# Patient Record
Sex: FEMALE | Race: WHITE | NOT HISPANIC OR LATINO
[De-identification: names, ages, dates, MRNs, and addresses within clinical notes are randomized per-mention and may not be internally consistent; named-entity substitution may affect disease eponyms.]

---

## 2017-02-06 ENCOUNTER — TRANSCRIPTION ENCOUNTER (OUTPATIENT)
Age: 52
End: 2017-02-06

## 2017-11-14 ENCOUNTER — APPOINTMENT (OUTPATIENT)
Dept: CT IMAGING | Facility: CLINIC | Age: 52
End: 2017-11-14
Payer: COMMERCIAL

## 2017-11-14 ENCOUNTER — OUTPATIENT (OUTPATIENT)
Dept: OUTPATIENT SERVICES | Facility: HOSPITAL | Age: 52
LOS: 1 days | End: 2017-11-14
Payer: COMMERCIAL

## 2017-11-14 DIAGNOSIS — Z00.8 ENCOUNTER FOR OTHER GENERAL EXAMINATION: ICD-10-CM

## 2017-11-14 PROCEDURE — 74174 CTA ABD&PLVS W/CONTRAST: CPT

## 2017-11-14 PROCEDURE — 74174 CTA ABD&PLVS W/CONTRAST: CPT | Mod: 26

## 2017-11-28 ENCOUNTER — APPOINTMENT (OUTPATIENT)
Dept: OBGYN | Facility: CLINIC | Age: 52
End: 2017-11-28
Payer: COMMERCIAL

## 2017-11-28 VITALS
SYSTOLIC BLOOD PRESSURE: 108 MMHG | OXYGEN SATURATION: 98 % | BODY MASS INDEX: 26.46 KG/M2 | DIASTOLIC BLOOD PRESSURE: 76 MMHG | RESPIRATION RATE: 18 BRPM | WEIGHT: 155 LBS | HEIGHT: 64 IN | HEART RATE: 88 BPM

## 2017-11-28 DIAGNOSIS — Z11.3 ENCOUNTER FOR SCREENING FOR INFECTIONS WITH A PREDOMINANTLY SEXUAL MODE OF TRANSMISSION: ICD-10-CM

## 2017-11-28 DIAGNOSIS — N95.2 POSTMENOPAUSAL ATROPHIC VAGINITIS: ICD-10-CM

## 2017-11-28 PROCEDURE — 99213 OFFICE O/P EST LOW 20 MIN: CPT

## 2017-12-04 LAB
HBV SURFACE AG SER QL: NONREACTIVE
HIV1+2 AB SPEC QL IA.RAPID: NONREACTIVE
HSV 1+2 IGG SER IA-IMP: NEGATIVE
HSV 1+2 IGG SER IA-IMP: POSITIVE
HSV1 IGG SER QL: 41.6 INDEX
HSV2 IGG SER QL: 0.09 INDEX
T PALLIDUM AB SER QL IA: NEGATIVE

## 2018-08-01 ENCOUNTER — APPOINTMENT (OUTPATIENT)
Dept: OBGYN | Facility: CLINIC | Age: 53
End: 2018-08-01
Payer: COMMERCIAL

## 2018-08-01 VITALS
HEART RATE: 88 BPM | OXYGEN SATURATION: 98 % | RESPIRATION RATE: 18 BRPM | BODY MASS INDEX: 25.61 KG/M2 | HEIGHT: 64 IN | SYSTOLIC BLOOD PRESSURE: 124 MMHG | WEIGHT: 150 LBS | DIASTOLIC BLOOD PRESSURE: 68 MMHG

## 2018-08-01 DIAGNOSIS — B00.9 HERPESVIRAL INFECTION, UNSPECIFIED: ICD-10-CM

## 2018-08-01 DIAGNOSIS — N90.9 NONINFLAMMATORY DISORDER OF VULVA AND PERINEUM, UNSPECIFIED: ICD-10-CM

## 2018-08-01 LAB
HSV 1+2 IGG SER IA-IMP: NEGATIVE
HSV 1+2 IGG SER IA-IMP: POSITIVE
HSV1 IGG SER QL: 33.7 INDEX
HSV2 IGG SER QL: 0.07 INDEX

## 2018-08-01 PROCEDURE — 99214 OFFICE O/P EST MOD 30 MIN: CPT

## 2018-08-03 LAB
HSV1 IGM SER QL: NORMAL TITER
HSV2 AB FLD-ACNC: NORMAL TITER

## 2018-08-06 ENCOUNTER — OTHER (OUTPATIENT)
Age: 53
End: 2018-08-06

## 2018-12-09 ENCOUNTER — OUTPATIENT (OUTPATIENT)
Dept: OUTPATIENT SERVICES | Facility: HOSPITAL | Age: 53
LOS: 1 days | Discharge: HOME | End: 2018-12-09
Payer: COMMERCIAL

## 2018-12-09 DIAGNOSIS — C50.919 MALIGNANT NEOPLASM OF UNSPECIFIED SITE OF UNSPECIFIED FEMALE BREAST: ICD-10-CM

## 2018-12-11 ENCOUNTER — APPOINTMENT (OUTPATIENT)
Dept: OBGYN | Facility: CLINIC | Age: 53
End: 2018-12-11
Payer: COMMERCIAL

## 2018-12-11 VITALS
SYSTOLIC BLOOD PRESSURE: 112 MMHG | WEIGHT: 155 LBS | HEIGHT: 64 IN | BODY MASS INDEX: 26.46 KG/M2 | DIASTOLIC BLOOD PRESSURE: 70 MMHG | OXYGEN SATURATION: 98 % | HEART RATE: 76 BPM

## 2018-12-11 DIAGNOSIS — N39.0 URINARY TRACT INFECTION, SITE NOT SPECIFIED: ICD-10-CM

## 2018-12-11 DIAGNOSIS — N76.0 ACUTE VAGINITIS: ICD-10-CM

## 2018-12-11 DIAGNOSIS — Z84.81 FAMILY HISTORY OF CARRIER OF GENETIC DISEASE: ICD-10-CM

## 2018-12-11 PROCEDURE — 99396 PREV VISIT EST AGE 40-64: CPT

## 2019-01-08 ENCOUNTER — APPOINTMENT (OUTPATIENT)
Dept: HEMATOLOGY ONCOLOGY | Facility: CLINIC | Age: 54
End: 2019-01-08
Payer: COMMERCIAL

## 2019-01-08 ENCOUNTER — RESULT REVIEW (OUTPATIENT)
Age: 54
End: 2019-01-08

## 2019-01-08 VITALS
SYSTOLIC BLOOD PRESSURE: 109 MMHG | HEIGHT: 62.09 IN | OXYGEN SATURATION: 98 % | RESPIRATION RATE: 14 BRPM | WEIGHT: 157.19 LBS | DIASTOLIC BLOOD PRESSURE: 69 MMHG | HEART RATE: 89 BPM | BODY MASS INDEX: 28.56 KG/M2 | TEMPERATURE: 99 F

## 2019-01-08 DIAGNOSIS — Z86.69 PERSONAL HISTORY OF OTHER DISEASES OF THE NERVOUS SYSTEM AND SENSE ORGANS: ICD-10-CM

## 2019-01-08 LAB
HCT VFR BLD CALC: 38.3 % — SIGNIFICANT CHANGE UP (ref 34.5–45)
HGB BLD-MCNC: 13.3 G/DL — SIGNIFICANT CHANGE UP (ref 11.5–15.5)
MCHC RBC-ENTMCNC: 29.9 PG — SIGNIFICANT CHANGE UP (ref 27–34)
MCHC RBC-ENTMCNC: 34.7 G/DL — SIGNIFICANT CHANGE UP (ref 32–36)
MCV RBC AUTO: 86.1 FL — SIGNIFICANT CHANGE UP (ref 80–100)
PLATELET # BLD AUTO: 276 K/UL — SIGNIFICANT CHANGE UP (ref 150–400)
RBC # BLD: 4.45 M/UL — SIGNIFICANT CHANGE UP (ref 3.8–5.2)
RBC # FLD: 11.3 % — SIGNIFICANT CHANGE UP (ref 10.3–14.5)
WBC # BLD: 4.8 K/UL — SIGNIFICANT CHANGE UP (ref 3.8–10.5)
WBC # FLD AUTO: 4.8 K/UL — SIGNIFICANT CHANGE UP (ref 3.8–10.5)

## 2019-01-08 PROCEDURE — 99245 OFF/OP CONSLTJ NEW/EST HI 55: CPT

## 2019-01-09 PROBLEM — Z86.69 HISTORY OF BELL'S PALSY: Status: RESOLVED | Noted: 2019-01-09 | Resolved: 2019-01-09

## 2019-01-09 LAB
25(OH)D3 SERPL-MCNC: 34.2 NG/ML
ESTIMATED AVERAGE GLUCOSE: 117 MG/DL
HBA1C MFR BLD HPLC: 5.7 %
TSH SERPL-ACNC: 1.52 UIU/ML

## 2019-01-09 NOTE — CONSULT LETTER
[Dear  ___] : Dear  [unfilled], [Consult Letter:] : I had the pleasure of evaluating your patient, [unfilled]. [Please see my note below.] : Please see my note below. [Consult Closing:] : Thank you very much for allowing me to participate in the care of this patient.  If you have any questions, please do not hesitate to contact me. [Sincerely,] : Sincerely, [DrLexus  ___] : Dr. GONZALEZ [DrLexus ___] : Dr. GONZALEZ [FreeTextEntry2] : Jhony Viera MD\par [address] [place] [address]\par [place], [place] [FreeTextEntry3] : Lori Tenorio MD\par Associate Chief of Clinical Affairs\par [facility]\par [facility]\par  of Medicine\par [employer] [facility] [employer]\par \par

## 2019-01-09 NOTE — PHYSICAL EXAM
[Fully active, able to carry on all pre-disease performance without restriction] : Status 0 - Fully active, able to carry on all pre-disease performance without restriction [Normal] : affect appropriate [de-identified] : s/p bilateral mastectomy with CONNIE flap reconstruction; no chest wall or axillary masses

## 2019-01-09 NOTE — HISTORY OF PRESENT ILLNESS
[Disease: _____________________] : Disease: [unfilled] [T: ___] : T[unfilled] [N: ___] : N[unfilled] [M: ___] : M[unfilled] [AJCC Stage: ____] : AJCC Stage: [unfilled] [de-identified] : Right breast cancer and Left LCIS  diagnosed at the age of 46\par 09/23/11 Bilateral mammogram and breast ultrasound showed a right breast 9 o'clock axis 6 cm FN focal abnormality\par 10/04/11 Left breast mammogram and bilateral breast ultrasound showed heterogeneous tissue at right 9 o'clock axis \par 04/11/12 Right breast ultrasound showed four hypoechoic nodular lesions at 9 o'clock 6 cm FN measuring 0.4 x 0.7 cm. One new lesion measuring 0.7 x 0.3 x 0.5 cm while other 3 unchanged. Aspiration/Core biopsy advised.\par 04/19/12 RIght breast 9 o'clock axis deep, sonoguided core biopsy: IDC and focal DCIS, nuclear grade 2, solid type, ER 82%, [place] 73%, HER-2 negative, KI-67 6%\par                Right breast 9 o'clock axis  superficial cluster of nodules consistent with fibroadenoma \par 04/26/12 MRI of the breast showed a 1 cm mass in the right 9 o'clock axis 7 cm FN corresponding to the biopsy proven malignancy. No evidences of multifocal or multicentric disease. Indeterminant 1 cm nodule in the left 6 o'clock axis for which MRI guided core biopsy was recommended\par 04/27/12 PET/CT scan negative\par 05/02/2012 [employer] genetic Comprehensive BRACAnalysis revealed BRCA2 deleterious mutation\par 05/07/12 Right mastectomy: 0.7 cm IDC, SBR 8/9 with 0/8 SLN involvement. DCIS, nuclear grade 3, solid and cribriform types DCIS is present admixed & away from invasive tumor\par                Left mastectomy: focal LCIS, and  minute focus of ADH with 0/2 SLN involvement\par 05/07/12 Oncotype DX score of 17 with 5 year risk of distant recurrence of 10% on Tamoxifen alone\par 2012: GEORGE-BSO\par 05/2012 Tamoxifen was started and after 2-3 years she was changed to Letrozole, probably in late 2014 or early 2015\par 01/03/18 CONNIE Flap reconstruction after an implant ruptured [de-identified] : Right breast: 0.7 cm IDC, SBR 8/9, ER 82%, FL 73%, HER2 negative, 0/8 LN involved, Oncotype = 17 (RR 10%) [de-identified] : Yumiko comes to establish care at [facility]. She is doing fairly well after being on the letrozole for about 4 years, although she is struggling with arthralgias in the back and hips as well as the hands.  She is also noting more fatigue in the last few months but this may be due to stress.  She is concerned as she has gained about 30 pounds since she had the BSO and has been on endocrine therapy.\par \par HEALTH MAINTENANCE:\par Mammogram: N/A s/p bilateral mastectomy\par Pap smear: s/p GEORGE-BSO\par Bone density DEXA scan: 12/05/17 showed T scores of -0.7 in the spine, -1.0 in the femoral neck and -1.2 in distal left forearm\par Colonoscopy: 01/18/16 colonoscopy one 2 mm polyp in the sigmoid colon. 02/20/17 Upper Endoscopy : multiple gastric polyps\par Genetic testing: BRCA 2 +\par

## 2019-01-10 ENCOUNTER — RESULT REVIEW (OUTPATIENT)
Age: 54
End: 2019-01-10

## 2019-01-10 LAB
ALBUMIN SERPL ELPH-MCNC: 4.5 G/DL
ALP BLD-CCNC: 74 U/L
ALT SERPL-CCNC: 15 U/L
ANION GAP SERPL CALC-SCNC: 13 MMOL/L
AST SERPL-CCNC: 16 U/L
BILIRUB SERPL-MCNC: 0.2 MG/DL
BUN SERPL-MCNC: 17 MG/DL
CALCIUM SERPL-MCNC: 9.4 MG/DL
CHLORIDE SERPL-SCNC: 103 MMOL/L
CHOLEST SERPL-MCNC: 186 MG/DL
CHOLEST/HDLC SERPL: 2.7 RATIO
CO2 SERPL-SCNC: 25 MMOL/L
CREAT SERPL-MCNC: 0.64 MG/DL
GLUCOSE SERPL-MCNC: 163 MG/DL
HDLC SERPL-MCNC: 70 MG/DL
LDLC SERPL CALC-MCNC: 83 MG/DL
POTASSIUM SERPL-SCNC: 4.3 MMOL/L
PROT SERPL-MCNC: 7.1 G/DL
SODIUM SERPL-SCNC: 141 MMOL/L
SURGICAL PATHOLOGY STUDY: SIGNIFICANT CHANGE UP
TRIGL SERPL-MCNC: 166 MG/DL

## 2019-01-10 PROCEDURE — 88321 CONSLTJ&REPRT SLD PREP ELSWR: CPT

## 2019-01-14 ENCOUNTER — RX RENEWAL (OUTPATIENT)
Age: 54
End: 2019-01-14

## 2019-02-10 ENCOUNTER — TRANSCRIPTION ENCOUNTER (OUTPATIENT)
Age: 54
End: 2019-02-10

## 2019-06-24 ENCOUNTER — TRANSCRIPTION ENCOUNTER (OUTPATIENT)
Age: 54
End: 2019-06-24

## 2019-07-05 ENCOUNTER — OUTPATIENT (OUTPATIENT)
Dept: OUTPATIENT SERVICES | Facility: HOSPITAL | Age: 54
LOS: 1 days | Discharge: HOME | End: 2019-07-05

## 2019-07-05 DIAGNOSIS — C50.919 MALIGNANT NEOPLASM OF UNSPECIFIED SITE OF UNSPECIFIED FEMALE BREAST: ICD-10-CM

## 2019-07-09 ENCOUNTER — APPOINTMENT (OUTPATIENT)
Dept: HEMATOLOGY ONCOLOGY | Facility: CLINIC | Age: 54
End: 2019-07-09
Payer: COMMERCIAL

## 2019-07-09 VITALS
DIASTOLIC BLOOD PRESSURE: 69 MMHG | RESPIRATION RATE: 16 BRPM | BODY MASS INDEX: 28.75 KG/M2 | SYSTOLIC BLOOD PRESSURE: 103 MMHG | OXYGEN SATURATION: 96 % | HEART RATE: 78 BPM | WEIGHT: 157.63 LBS | TEMPERATURE: 98.6 F

## 2019-07-09 PROCEDURE — 99214 OFFICE O/P EST MOD 30 MIN: CPT

## 2019-07-16 NOTE — PHYSICAL EXAM
[Fully active, able to carry on all pre-disease performance without restriction] : Status 0 - Fully active, able to carry on all pre-disease performance without restriction [Normal] : affect appropriate [de-identified] : s/p bilateral mastectomy with CONNIE flap reconstruction; no chest wall or axillary masses

## 2019-07-16 NOTE — HISTORY OF PRESENT ILLNESS
[Disease: _____________________] : Disease: [unfilled] [T: ___] : T[unfilled] [N: ___] : N[unfilled] [M: ___] : M[unfilled] [AJCC Stage: ____] : AJCC Stage: [unfilled] [Date: ____________] : Patient's last distress assessment performed on [unfilled]. [3 - Distress Level] : Distress Level: 3 [Patient given social work contact information and resource sheet] : Patient was given social work contact information and resource sheet [de-identified] : Right breast cancer and Left LCIS  diagnosed at the age of 46\par 09/23/11 Bilateral mammogram and breast ultrasound showed a right breast 9 o'clock axis 6 cm FN focal abnormality\par 10/04/11 Left breast mammogram and bilateral breast ultrasound showed heterogeneous tissue at right 9 o'clock axis \par 04/11/12 Right breast ultrasound showed four hypoechoic nodular lesions at 9 o'clock 6 cm FN measuring 0.4 x 0.7 cm. One new lesion measuring 0.7 x 0.3 x 0.5 cm while other 3 unchanged. Aspiration/Core biopsy advised.\par 04/19/12 RIght breast 9 o'clock axis deep, sonoguided core biopsy: IDC and focal DCIS, nuclear grade 2, solid type, ER 82%, [place] 73%, HER-2 negative, KI-67 6%\par                Right breast 9 o'clock axis  superficial cluster of nodules consistent with fibroadenoma \par 04/26/12 MRI of the breast showed a 1 cm mass in the right 9 o'clock axis 7 cm FN corresponding to the biopsy proven malignancy. No evidences of multifocal or multicentric disease. Indeterminant 1 cm nodule in the left 6 o'clock axis for which MRI guided core biopsy was recommended\par 04/27/12 PET/CT scan negative\par 05/02/2012 [email or web address] genetic Comprehensive BRACAnalysis revealed BRCA2 deleterious mutation\par 05/07/12 Right mastectomy: 0.7 cm IDC, SBR 8/9 with 0/8 SLN involvement. DCIS, nuclear grade 3, solid and cribriform types DCIS is present admixed & away from invasive tumor\par                Left mastectomy: focal LCIS and minute focus of ADH with 0/2 SLN \par 05/07/12 Oncotype DX score of 17 with 5 year risk of distant recurrence of 10% on Tamoxifen alone\par 2012: GEORGE-BSO\par 05/2012 - 12/14 Tamoxifen \par 1/2015 Letrozole started\par 01/03/18 CONNIE Flap reconstruction after an implant ruptured [de-identified] : Right: 0.7 cm IDC, SBR 8/9, ER 82%, FL 73%, HER-2 negative, 0/8 SLN, Oncotype = 17 (RR 10%); Left: LCIS [de-identified] : Yumiko continues to do well on letrozole which she has been on since 1/15, although she does continue to have some arthralgias in the back, hips and hands. In general she is fairly active and the arthralgias have not limited her activities.  She is frustrated about her weight but it has been fairly stable in the last few years, fluctuating between 68 and 71 kg.  She had gained weight following her BSO.\par \par HEALTH MAINTENANCE:\par Mammogram: N/A s/p bilateral mastectomy\par Pap smear: s/p GEORGE-BSO\par Bone density DEXA scan: 12/05/17 showed T scores of -0.7 in the spine, -1.0 in the femoral neck and -1.2 in distal left forearm\par Colonoscopy: 01/18/16 colonoscopy one 2 mm polyp in the sigmoid colon. 02/20/17 Upper Endoscopy: multiple gastric polyps\par Genetic testing: BRCA 2 +\par

## 2019-12-08 ENCOUNTER — FORM ENCOUNTER (OUTPATIENT)
Age: 54
End: 2019-12-08

## 2019-12-09 ENCOUNTER — APPOINTMENT (OUTPATIENT)
Dept: RADIOLOGY | Facility: HOSPITAL | Age: 54
End: 2019-12-09
Payer: COMMERCIAL

## 2019-12-09 ENCOUNTER — OUTPATIENT (OUTPATIENT)
Dept: OUTPATIENT SERVICES | Facility: HOSPITAL | Age: 54
LOS: 1 days | End: 2019-12-09
Payer: COMMERCIAL

## 2019-12-09 DIAGNOSIS — M85.80 OTHER SPECIFIED DISORDERS OF BONE DENSITY AND STRUCTURE, UNSPECIFIED SITE: ICD-10-CM

## 2019-12-09 PROCEDURE — 77080 DXA BONE DENSITY AXIAL: CPT | Mod: 26

## 2019-12-09 PROCEDURE — 77080 DXA BONE DENSITY AXIAL: CPT

## 2019-12-24 ENCOUNTER — TRANSCRIPTION ENCOUNTER (OUTPATIENT)
Age: 54
End: 2019-12-24

## 2019-12-25 ENCOUNTER — TRANSCRIPTION ENCOUNTER (OUTPATIENT)
Age: 54
End: 2019-12-25

## 2019-12-31 ENCOUNTER — TRANSCRIPTION ENCOUNTER (OUTPATIENT)
Age: 54
End: 2019-12-31

## 2020-01-03 ENCOUNTER — OUTPATIENT (OUTPATIENT)
Dept: OUTPATIENT SERVICES | Facility: HOSPITAL | Age: 55
LOS: 1 days | Discharge: HOME | End: 2020-01-03

## 2020-01-03 DIAGNOSIS — C50.919 MALIGNANT NEOPLASM OF UNSPECIFIED SITE OF UNSPECIFIED FEMALE BREAST: ICD-10-CM

## 2020-01-07 ENCOUNTER — APPOINTMENT (OUTPATIENT)
Dept: HEMATOLOGY ONCOLOGY | Facility: CLINIC | Age: 55
End: 2020-01-07
Payer: COMMERCIAL

## 2020-02-11 ENCOUNTER — OUTPATIENT (OUTPATIENT)
Dept: OUTPATIENT SERVICES | Facility: HOSPITAL | Age: 55
LOS: 1 days | Discharge: HOME | End: 2020-02-11

## 2020-02-11 DIAGNOSIS — C50.919 MALIGNANT NEOPLASM OF UNSPECIFIED SITE OF UNSPECIFIED FEMALE BREAST: ICD-10-CM

## 2020-02-12 NOTE — OB HISTORY
[Menarche Age: ____] : age at menarche was [unfilled] [Menopause Age: ____] : age at menopause was [unfilled] [___] : Total Pregnancies: [unfilled]

## 2020-02-13 ENCOUNTER — APPOINTMENT (OUTPATIENT)
Dept: HEMATOLOGY ONCOLOGY | Facility: CLINIC | Age: 55
End: 2020-02-13
Payer: COMMERCIAL

## 2020-02-13 VITALS
OXYGEN SATURATION: 98 % | HEART RATE: 75 BPM | BODY MASS INDEX: 29.43 KG/M2 | TEMPERATURE: 98.7 F | SYSTOLIC BLOOD PRESSURE: 115 MMHG | DIASTOLIC BLOOD PRESSURE: 77 MMHG | WEIGHT: 161.38 LBS | RESPIRATION RATE: 16 BRPM

## 2020-02-13 PROCEDURE — 99214 OFFICE O/P EST MOD 30 MIN: CPT

## 2020-02-13 NOTE — PHYSICAL EXAM
[Fully active, able to carry on all pre-disease performance without restriction] : Status 0 - Fully active, able to carry on all pre-disease performance without restriction [Normal] : grossly intact [de-identified] : s/p bilateral mastectomy with CONNIE flap reconstruction; no chest wall or axillary masses

## 2020-02-13 NOTE — HISTORY OF PRESENT ILLNESS
[Disease: _____________________] : Disease: [unfilled] [T: ___] : T[unfilled] [N: ___] : N[unfilled] [M: ___] : M[unfilled] [AJCC Stage: ____] : AJCC Stage: [unfilled] [Patient given social work contact information and resource sheet] : Patient was given social work contact information and resource sheet [Date: ____________] : Patient's last distress assessment performed on [unfilled]. [3 - Distress Level] : Distress Level: 3 [de-identified] : Right breast cancer and Left LCIS  diagnosed at the age of 46\par 09/23/11 Bilateral mammogram and breast ultrasound showed a right breast 9 o'clock axis 6 cm FN focal abnormality\par 10/04/11 Left breast mammogram and bilateral breast ultrasound showed heterogeneous tissue at right 9 o'clock axis \par 04/11/12 Right breast ultrasound showed four hypoechoic nodular lesions at 9 o'clock 6 cm FN measuring 0.4 x 0.7 cm. One new lesion measuring 0.7 x 0.3 x 0.5 cm while other 3 unchanged. Aspiration/Core biopsy advised.\par 04/19/12 RIght breast 9 o'clock axis deep, sonoguided core biopsy: IDC and focal DCIS, nuclear grade 2, solid type, ER 82%, [place] 73%, HER-2 negative, KI-67 6%\par                Right breast 9 o'clock axis  superficial cluster of nodules consistent with fibroadenoma \par 04/26/12 MRI of the breast showed a 1 cm mass in the right 9 o'clock axis 7 cm FN corresponding to the biopsy proven malignancy. No evidences of multifocal or multicentric disease. Indeterminant 1 cm nodule in the left 6 o'clock axis for which MRI guided core biopsy was recommended\par 04/27/12 PET/CT scan negative\par 05/02/2012 [employer] genetic Comprehensive BRACAnalysis revealed BRCA2 deleterious mutation\par 05/07/12 Right mastectomy: 0.7 cm IDC, SBR 8/9 with 0/8 SLN involvement. DCIS, nuclear grade 3, solid and cribriform types DCIS is present admixed & away from invasive tumor.   Left mastectomy showed focal LCIS and minute focus of ADH with 0/2 SLN \par 05/07/12 Oncotype DX score of 17 with 5 year risk of distant recurrence of 10% on Tamoxifen alone\par 2012: GEORGE-BSO\par 05/2012 - 12/14 Tamoxifen \par 1/2015 Letrozole started\par 01/03/18 CONNIE Flap reconstruction after an implant ruptured [de-identified] : Right: 0.7 cm IDC, SBR 8/9, ER 82%, [place] 73%, HER-2 negative, 0/8 SLN, Oncotype = 17 (RR 10%); Left: LCIS [de-identified] : Yumiko continues to do well on letrozole which she has been on since 1/15, although she does continue to have some arthralgias in the back, hips and hands. She takes turmeric with pepper which helps with the arthralgias. \par She is frustrated about her weight as it has been harder to manage since her BSO.\par 12/27/19 She went to [place] with a fever, cough and shortness of breath. CXR and CT scan showed a RUL consolidation, but the radiologist read it as pneumonia vs malignancy.  Her symptoms resolved with antibiotics and then a follow up PET/CT was scheduled.\par 1/27/20 PET/CT scan at [facility] showed interval resolution of RUL consolidation with trace residual bronchial wall thickening and groundglass opacity in right lung apex, compatible with resolution of pneumonia.\par \par HEALTH MAINTENANCE:\par Mammogram: s/p bilateral mastectomy\par Pap smear: s/p GEORGE-BSO\par Bone density DEXA scan: 12/9/19 showed T scores of -1.2 in spine, -0.4 in hip and -1.7 in radius and ulna\par                                           12/05/17 showed T scores of -0.7 in the spine, -1.0 in the femoral neck and -1.2 in distal left forearm\par Colonoscopy: 01/18/16 colonoscopy one 2 mm polyp in the sigmoid colon. 02/20/17 Upper Endoscopy: multiple gastric polyps\par Genetic testing: BRCA 2 +; daughter was tested and was positive so had bilateral mastectomies last summer at age 24; her son is 20 and has not been tested\par

## 2020-07-28 ENCOUNTER — APPOINTMENT (OUTPATIENT)
Dept: OBGYN | Facility: CLINIC | Age: 55
End: 2020-07-28
Payer: COMMERCIAL

## 2020-07-28 VITALS
HEART RATE: 84 BPM | WEIGHT: 163 LBS | DIASTOLIC BLOOD PRESSURE: 60 MMHG | SYSTOLIC BLOOD PRESSURE: 110 MMHG | BODY MASS INDEX: 30 KG/M2 | HEIGHT: 62 IN | TEMPERATURE: 97.6 F

## 2020-07-28 DIAGNOSIS — K43.9 VENTRAL HERNIA W/OUT OBSTRUCTION OR GANGRENE: ICD-10-CM

## 2020-07-28 DIAGNOSIS — R14.0 ABDOMINAL DISTENSION (GASEOUS): ICD-10-CM

## 2020-07-28 PROCEDURE — 99396 PREV VISIT EST AGE 40-64: CPT

## 2020-07-28 NOTE — PHYSICAL EXAM
[Alert] : alert [Awake] : awake [Right Breast Absent] : a total mastectomy [Breast Reconstruction Right] : breast reconstruction [Left Breast Absent] : a total mastectomy [Breast Reconstruction Left] : breast reconstruction [Soft] : soft [Oriented x3] : oriented to person, place, and time [No Lesions] : no genitalia lesions [Vulvar Atrophy] : vulvar atrophy [Normal] : urethra [Absent] : absent [No Bleeding] : there was no active vaginal bleeding [Adnexa Absent] : absent bilaterally [Normal rectal exam] : was normal [Acute Distress] : no acute distress [LAD] : no lymphadenopathy [Thyroid Nodule] : no thyroid nodule [Goiter] : no goiter [Mass] : no breast mass [Axillary LAD] : no axillary lymphadenopathy [Nipple Discharge] : no nipple discharge [Distended] : not distended [Tender] : non tender [H/Smegaly] : no hepatosplenomegaly [Depressed Mood] : not depressed [Flat Affect] : affect not flat

## 2020-07-28 NOTE — HISTORY OF PRESENT ILLNESS
[Postmenopausal] : is postmenopausal [Sexually Active] : is sexually active [HPV Vaccine NA Due to Age] : HPV vaccine not available to patient due to age [Fair] : being in fair health [Last Bone Density ___] : Last bone density studies [unfilled] [Last Colonoscopy ___] : Last colonoscopy [unfilled] [Last Pap ___] : Last cervical pap smear was [unfilled]

## 2020-07-28 NOTE — CHIEF COMPLAINT
[Annual Visit] : annual visit [FreeTextEntry1] : pt daughter BRCA 2 pos--[facility] for RRS.Referred to [facility] and [facility]\par colon 2016,dexa 2017\par pt with abd bloating,discomfort\par GEORGE/BSO 2012\par Pt BRCA 2 pos\par Inc risk pancreatic ca and peritoneal carcinomatosis

## 2020-11-04 ENCOUNTER — OUTPATIENT (OUTPATIENT)
Dept: OUTPATIENT SERVICES | Facility: HOSPITAL | Age: 55
LOS: 1 days | Discharge: HOME | End: 2020-11-04

## 2020-11-04 DIAGNOSIS — C50.919 MALIGNANT NEOPLASM OF UNSPECIFIED SITE OF UNSPECIFIED FEMALE BREAST: ICD-10-CM

## 2020-11-10 ENCOUNTER — RESULT REVIEW (OUTPATIENT)
Age: 55
End: 2020-11-10

## 2020-11-10 ENCOUNTER — APPOINTMENT (OUTPATIENT)
Dept: HEMATOLOGY ONCOLOGY | Facility: CLINIC | Age: 55
End: 2020-11-10
Payer: COMMERCIAL

## 2020-11-10 VITALS
DIASTOLIC BLOOD PRESSURE: 71 MMHG | BODY MASS INDEX: 27.88 KG/M2 | HEIGHT: 62.44 IN | RESPIRATION RATE: 16 BRPM | WEIGHT: 155.4 LBS | SYSTOLIC BLOOD PRESSURE: 105 MMHG | HEART RATE: 73 BPM | OXYGEN SATURATION: 99 % | TEMPERATURE: 97.5 F

## 2020-11-10 LAB
BASOPHILS # BLD AUTO: 0.08 K/UL — SIGNIFICANT CHANGE UP (ref 0–0.2)
BASOPHILS NFR BLD AUTO: 1.1 % — SIGNIFICANT CHANGE UP (ref 0–2)
EOSINOPHIL # BLD AUTO: 0.23 K/UL — SIGNIFICANT CHANGE UP (ref 0–0.5)
EOSINOPHIL NFR BLD AUTO: 3.1 % — SIGNIFICANT CHANGE UP (ref 0–6)
HCT VFR BLD CALC: 38 % — SIGNIFICANT CHANGE UP (ref 34.5–45)
HGB BLD-MCNC: 12.4 G/DL — SIGNIFICANT CHANGE UP (ref 11.5–15.5)
IMM GRANULOCYTES NFR BLD AUTO: 0.4 % — SIGNIFICANT CHANGE UP (ref 0–1.5)
LYMPHOCYTES # BLD AUTO: 1.73 K/UL — SIGNIFICANT CHANGE UP (ref 1–3.3)
LYMPHOCYTES # BLD AUTO: 23 % — SIGNIFICANT CHANGE UP (ref 13–44)
MCHC RBC-ENTMCNC: 28.8 PG — SIGNIFICANT CHANGE UP (ref 27–34)
MCHC RBC-ENTMCNC: 32.6 G/DL — SIGNIFICANT CHANGE UP (ref 32–36)
MCV RBC AUTO: 88.2 FL — SIGNIFICANT CHANGE UP (ref 80–100)
MONOCYTES # BLD AUTO: 0.53 K/UL — SIGNIFICANT CHANGE UP (ref 0–0.9)
MONOCYTES NFR BLD AUTO: 7.1 % — SIGNIFICANT CHANGE UP (ref 2–14)
NEUTROPHILS # BLD AUTO: 4.91 K/UL — SIGNIFICANT CHANGE UP (ref 1.8–7.4)
NEUTROPHILS NFR BLD AUTO: 65.3 % — SIGNIFICANT CHANGE UP (ref 43–77)
NRBC # BLD: 0 /100 WBCS — SIGNIFICANT CHANGE UP (ref 0–0)
PLATELET # BLD AUTO: 273 K/UL — SIGNIFICANT CHANGE UP (ref 150–400)
RBC # BLD: 4.31 M/UL — SIGNIFICANT CHANGE UP (ref 3.8–5.2)
RBC # FLD: 13.1 % — SIGNIFICANT CHANGE UP (ref 10.3–14.5)
WBC # BLD: 7.51 K/UL — SIGNIFICANT CHANGE UP (ref 3.8–10.5)
WBC # FLD AUTO: 7.51 K/UL — SIGNIFICANT CHANGE UP (ref 3.8–10.5)

## 2020-11-10 PROCEDURE — 99213 OFFICE O/P EST LOW 20 MIN: CPT

## 2020-11-10 PROCEDURE — 99072 ADDL SUPL MATRL&STAF TM PHE: CPT

## 2020-11-10 NOTE — HISTORY OF PRESENT ILLNESS
[Disease: _____________________] : Disease: [unfilled] [T: ___] : T[unfilled] [N: ___] : N[unfilled] [M: ___] : M[unfilled] [AJCC Stage: ____] : AJCC Stage: [unfilled] [Date: ____________] : Patient's last distress assessment performed on [unfilled]. [3 - Distress Level] : Distress Level: 3 [Patient given social work contact information and resource sheet] : Patient was given social work contact information and resource sheet [de-identified] : Right breast cancer and Left LCIS  diagnosed at the age of 46\par 09/23/11 Bilateral mammogram and breast ultrasound showed a right breast 9 o'clock axis 6 cm FN focal abnormality\par 10/04/11 Left breast mammogram and bilateral breast ultrasound showed heterogeneous tissue at right 9 o'clock axis \par 04/11/12 Right breast ultrasound showed four hypoechoic nodular lesions at 9 o'clock 6 cm FN measuring 0.4 x 0.7 cm. One new lesion measuring 0.7 x 0.3 x 0.5 cm while other 3 unchanged. Aspiration/Core biopsy advised.\par 04/19/12 RIght breast 9 o'clock axis deep, sonoguided core biopsy: IDC and focal DCIS, nuclear grade 2, solid type, ER 82%, [place] 73%, HER-2 negative, KI-67 6%\par                Right breast 9 o'clock axis  superficial cluster of nodules consistent with fibroadenoma \par 04/26/12 MRI of the breast showed a 1 cm mass in the right 9 o'clock axis 7 cm FN corresponding to the biopsy proven malignancy. No evidences of multifocal or multicentric disease. Indeterminant 1 cm nodule in the left 6 o'clock axis for which MRI guided core biopsy was recommended\par 04/27/12 PET/CT scan negative\par 05/02/2012 [employer] genetic Comprehensive BRACAnalysis revealed BRCA2 deleterious mutation\par 05/07/12 Right mastectomy: 0.7 cm IDC, SBR 8/9 with 0/8 SLN involvement. DCIS, nuclear grade 3, solid and cribriform types DCIS is present admixed & away from invasive tumor.   Left mastectomy showed focal LCIS and minute focus of ADH with 0/2 SLN \par 05/07/12 Oncotype DX score of 17 with 5 year risk of distant recurrence of 10% on Tamoxifen alone\par 2012: GEORGE-BSO\par 05/2012 - 12/14 Tamoxifen \par 1/2015 Letrozole started\par 01/03/18 CONNIE Flap reconstruction after an implant ruptured [de-identified] : Right: 0.7 cm IDC, SBR 8/9, ER 82%, [place] 73%, HER-2 negative, 0/8 SLN, Oncotype = 17 (RR 10%); Left: LCIS [de-identified] : Yumiko started letrozole in 1/2015 and continues to tolerate it well aside from mild to moderate arthralgias in the back, hips and hands. She takes turmeric with pepper which helps with the arthralgias. She has recently developed a flare of right sciatica and is doing acupuncture and chiropractic care. She is also on Naproxen and Flexeril at night.\par \par 1/27/20 PET/CT scan at [facility] showed interval resolution of RUL consolidation with trace residual bronchial wall thickening and groundglass opacity in right lung apex, compatible with resolution of pneumonia.\par \par HEALTH MAINTENANCE:\par Mammogram: s/p bilateral mastectomy\par Pap smear: s/p GEORGE-BSO\par Bone density DEXA scan: 12/9/19 showed T scores of -1.2 in spine, -0.4 in hip and -1.7 in radius and ulna\par                                           12/05/17 showed T scores of -0.7 in the spine, -1.0 in the femoral neck and -1.2 in distal left forearm\par Colonoscopy: 01/18/16 colonoscopy one 2 mm polyp in the sigmoid colon. 02/20/17 Upper Endoscopy: multiple gastric polyps\par Genetic testing: BRCA 2 +; daughter was tested and was positive so had bilateral mastectomies last summer at age 24; her son is 20 and has not been tested\par

## 2020-11-10 NOTE — PHYSICAL EXAM
[Fully active, able to carry on all pre-disease performance without restriction] : Status 0 - Fully active, able to carry on all pre-disease performance without restriction [Normal] : affect appropriate [de-identified] : s/p bilateral mastectomy with CONNIE flap reconstruction; no chest wall or axillary masses

## 2020-11-11 LAB
25(OH)D3 SERPL-MCNC: 29.4 NG/ML
ALBUMIN SERPL ELPH-MCNC: 4.4 G/DL
ALP BLD-CCNC: 77 U/L
ALT SERPL-CCNC: 10 U/L
ANION GAP SERPL CALC-SCNC: 12 MMOL/L
AST SERPL-CCNC: 16 U/L
BILIRUB SERPL-MCNC: <0.2 MG/DL
BUN SERPL-MCNC: 25 MG/DL
CALCIUM SERPL-MCNC: 9.6 MG/DL
CHLORIDE SERPL-SCNC: 101 MMOL/L
CHOLEST SERPL-MCNC: 207 MG/DL
CO2 SERPL-SCNC: 25 MMOL/L
CREAT SERPL-MCNC: 0.7 MG/DL
ESTIMATED AVERAGE GLUCOSE: 117 MG/DL
GLUCOSE SERPL-MCNC: 102 MG/DL
HBA1C MFR BLD HPLC: 5.7 %
HDLC SERPL-MCNC: 69 MG/DL
LDLC SERPL CALC-MCNC: 114 MG/DL
NONHDLC SERPL-MCNC: 138 MG/DL
POTASSIUM SERPL-SCNC: 4.4 MMOL/L
PROT SERPL-MCNC: 7 G/DL
SARS-COV-2 IGG SERPL IA-ACNC: 0.09 INDEX
SARS-COV-2 IGG SERPL QL IA: NEGATIVE
SODIUM SERPL-SCNC: 139 MMOL/L
TRIGL SERPL-MCNC: 119 MG/DL
TSH SERPL-ACNC: 1.89 UIU/ML

## 2020-11-12 ENCOUNTER — OUTPATIENT (OUTPATIENT)
Dept: OUTPATIENT SERVICES | Facility: HOSPITAL | Age: 55
LOS: 1 days | End: 2020-11-12
Payer: COMMERCIAL

## 2020-11-12 ENCOUNTER — APPOINTMENT (OUTPATIENT)
Dept: RADIOLOGY | Facility: CLINIC | Age: 55
End: 2020-11-12
Payer: COMMERCIAL

## 2020-11-12 DIAGNOSIS — Z00.8 ENCOUNTER FOR OTHER GENERAL EXAMINATION: ICD-10-CM

## 2020-11-12 PROCEDURE — 72100 X-RAY EXAM L-S SPINE 2/3 VWS: CPT

## 2020-11-12 PROCEDURE — 72100 X-RAY EXAM L-S SPINE 2/3 VWS: CPT | Mod: 26

## 2020-11-20 ENCOUNTER — NON-APPOINTMENT (OUTPATIENT)
Age: 55
End: 2020-11-20

## 2020-12-10 ENCOUNTER — TRANSCRIPTION ENCOUNTER (OUTPATIENT)
Age: 55
End: 2020-12-10

## 2021-05-18 ENCOUNTER — RX RENEWAL (OUTPATIENT)
Age: 56
End: 2021-05-18

## 2021-06-18 ENCOUNTER — OUTPATIENT (OUTPATIENT)
Dept: OUTPATIENT SERVICES | Facility: HOSPITAL | Age: 56
LOS: 1 days | Discharge: HOME | End: 2021-06-18

## 2021-06-18 DIAGNOSIS — C50.919 MALIGNANT NEOPLASM OF UNSPECIFIED SITE OF UNSPECIFIED FEMALE BREAST: ICD-10-CM

## 2021-06-22 ENCOUNTER — APPOINTMENT (OUTPATIENT)
Dept: HEMATOLOGY ONCOLOGY | Facility: CLINIC | Age: 56
End: 2021-06-22
Payer: COMMERCIAL

## 2021-06-22 VITALS
SYSTOLIC BLOOD PRESSURE: 104 MMHG | HEART RATE: 72 BPM | DIASTOLIC BLOOD PRESSURE: 60 MMHG | RESPIRATION RATE: 16 BRPM | WEIGHT: 155.4 LBS | OXYGEN SATURATION: 96 % | TEMPERATURE: 98.1 F | HEIGHT: 62.32 IN | BODY MASS INDEX: 28.24 KG/M2

## 2021-06-22 PROCEDURE — 99214 OFFICE O/P EST MOD 30 MIN: CPT

## 2021-06-22 PROCEDURE — 99072 ADDL SUPL MATRL&STAF TM PHE: CPT

## 2021-06-22 RX ORDER — BUPROPION HYDROCHLORIDE 150 MG/1
150 TABLET, EXTENDED RELEASE ORAL
Qty: 90 | Refills: 0 | Status: ACTIVE | COMMUNITY
Start: 2021-04-03

## 2021-06-24 NOTE — PHYSICAL EXAM
[Fully active, able to carry on all pre-disease performance without restriction] : Status 0 - Fully active, able to carry on all pre-disease performance without restriction [Normal] : affect appropriate [de-identified] : s/p bilateral mastectomy with CONNIE flap reconstruction; no chest wall or axillary masses

## 2021-06-24 NOTE — HISTORY OF PRESENT ILLNESS
[Disease: _____________________] : Disease: [unfilled] [T: ___] : T[unfilled] [N: ___] : N[unfilled] [M: ___] : M[unfilled] [AJCC Stage: ____] : AJCC Stage: [unfilled] [de-identified] : Right breast cancer and Left LCIS  diagnosed at the age of 46\par 09/23/11 Bilateral mammogram and breast ultrasound showed a right breast 9 o'clock axis 6 cm FN focal abnormality\par 10/04/11 Left breast mammogram and bilateral breast ultrasound showed heterogeneous tissue at right 9 o'clock axis \par 04/11/12 Right breast ultrasound showed four hypoechoic nodular lesions at 9 o'clock 6 cm FN measuring 0.4 x 0.7 cm. One new lesion measuring 0.7 x 0.3 x 0.5 cm while other 3 unchanged. Aspiration/Core biopsy advised.\par 04/19/12 RIght breast 9 o'clock axis deep, sonoguided core biopsy: IDC and focal DCIS, nuclear grade 2, solid type, ER 82%, [place] 73%, HER-2 negative, KI-67 6%\par                Right breast 9 o'clock axis  superficial cluster of nodules consistent with fibroadenoma \par 04/26/12 MRI of the breast showed a 1 cm mass in the right 9 o'clock axis 7 cm FN corresponding to the biopsy proven malignancy. No evidences of multifocal or multicentric disease. Indeterminant 1 cm nodule in the left 6 o'clock axis for which MRI guided core biopsy was recommended\par 04/27/12 PET/CT scan negative\par 05/02/2012 [employer] genetic Comprehensive BRACAnalysis revealed BRCA2 deleterious mutation\par 05/07/12 Right mastectomy: 0.7 cm IDC, SBR 8/9 with 0/8 SLN involvement. DCIS, nuclear grade 3, solid and cribriform types DCIS is present admixed & away from invasive tumor.   Left mastectomy showed focal LCIS and minute focus of ADH with 0/2 SLN \par 05/07/12 Oncotype DX score of 17 with 5 year risk of distant recurrence of 10% on Tamoxifen alone\par 2012: GEORGE-BSO\par 05/2012 - 12/14 Tamoxifen \par 1/2015 Letrozole started\par 01/03/18 CONNIE Flap reconstruction after an implant ruptured [de-identified] : Right: 0.7 cm IDC, SBR 8/9, ER 82%, [place] 73%, HER-2 negative, 0/8 SLN, Oncotype = 17 (RR 10%); Left: LCIS [de-identified] : Yumiko started letrozole in 1/2015 and continues to tolerate it well aside from mild to moderate arthralgias in the back, hips and hands. \par She takes turmeric with pepper which helps with the arthralgias. She reports that most of her joint pain is worse in the morning and gets better as the day goes by.\par She continues to struggle with memory loss and brain fog, which she finds to be a little more prominent than before. She also reports having and MRI of the brain from 01/02/20 was negative, which was reassuring but she still gets very anxious and if her symptoms are related to brain mets.\par She also states feeling very fatigued lately, which may be related to her not sleeping to well at nights.\par She is very happy about finally losing some weight, which was a struggle for quite some time, but is very happy with results and is planning on continuing with her diet until she reaches a healthy weight for herself.\par She got the Pfizer vaccine earlier in the year without any issues\par Her daughter who also tested positive for BRACA 2, has taken the necessary measures. She is s/p prophylactic mastectomies  and recently had her eggs frozen.\par \par 1/27/20 PET/[facility] scan at [facility] showed interval resolution of RUL consolidation with trace residual bronchial wall thickening and groundglass opacity in right lung apex, compatible with resolution of pneumonia.\par \par HEALTH MAINTENANCE:\par Mammogram: s/p bilateral mastectomy\par Pap smear: s/p GEORGE-BSO\par Bone density DEXA scan: 12/9/19 showed T scores of -1.2 in spine, -0.4 in hip and -1.7 in radius and ulna\par                                           12/05/17 showed T scores of -0.7 in the spine, -1.0 in the femoral neck and -1.2 in distal left forearm\par Colonoscopy: 01/18/16 colonoscopy one 2 mm polyp in the sigmoid colon. 02/20/17 Upper Endoscopy: multiple gastric polyps\par Genetic testing: BRCA 2 +; daughter was tested and was positive so had bilateral mastectomies last summer at age 24; her son is 20 and has not been tested\par

## 2021-07-06 ENCOUNTER — TRANSCRIPTION ENCOUNTER (OUTPATIENT)
Age: 56
End: 2021-07-06

## 2021-07-29 ENCOUNTER — APPOINTMENT (OUTPATIENT)
Dept: OBGYN | Facility: CLINIC | Age: 56
End: 2021-07-29

## 2021-12-07 ENCOUNTER — RX RENEWAL (OUTPATIENT)
Age: 56
End: 2021-12-07

## 2021-12-10 ENCOUNTER — OUTPATIENT (OUTPATIENT)
Dept: OUTPATIENT SERVICES | Facility: HOSPITAL | Age: 56
LOS: 1 days | End: 2021-12-10
Payer: COMMERCIAL

## 2021-12-10 ENCOUNTER — APPOINTMENT (OUTPATIENT)
Dept: RADIOLOGY | Facility: CLINIC | Age: 56
End: 2021-12-10
Payer: COMMERCIAL

## 2021-12-10 DIAGNOSIS — M85.80 OTHER SPECIFIED DISORDERS OF BONE DENSITY AND STRUCTURE, UNSPECIFIED SITE: ICD-10-CM

## 2021-12-10 DIAGNOSIS — Z00.8 ENCOUNTER FOR OTHER GENERAL EXAMINATION: ICD-10-CM

## 2021-12-10 PROCEDURE — 77085 DXA BONE DENSITY AXL VRT FX: CPT | Mod: 26

## 2021-12-10 PROCEDURE — 77085 DXA BONE DENSITY AXL VRT FX: CPT

## 2022-01-15 ENCOUNTER — OUTPATIENT (OUTPATIENT)
Dept: OUTPATIENT SERVICES | Facility: HOSPITAL | Age: 57
LOS: 1 days | Discharge: HOME | End: 2022-01-15

## 2022-01-15 DIAGNOSIS — C50.919 MALIGNANT NEOPLASM OF UNSPECIFIED SITE OF UNSPECIFIED FEMALE BREAST: ICD-10-CM

## 2022-01-18 ENCOUNTER — APPOINTMENT (OUTPATIENT)
Dept: HEMATOLOGY ONCOLOGY | Facility: CLINIC | Age: 57
End: 2022-01-18
Payer: COMMERCIAL

## 2022-01-18 PROCEDURE — 99214 OFFICE O/P EST MOD 30 MIN: CPT | Mod: 95

## 2022-01-18 RX ORDER — SODIUM SULFATE, POTASSIUM SULFATE, MAGNESIUM SULFATE 17.5; 3.13; 1.6 G/ML; G/ML; G/ML
17.5-3.13-1.6 SOLUTION, CONCENTRATE ORAL
Qty: 354 | Refills: 0 | Status: DISCONTINUED | COMMUNITY
Start: 2021-03-10 | End: 2022-01-18

## 2022-01-18 RX ORDER — COVID-19 TEST SPECIMEN COLLECT
MISCELLANEOUS MISCELLANEOUS
Qty: 1 | Refills: 0 | Status: DISCONTINUED | COMMUNITY
Start: 2021-09-04 | End: 2022-01-18

## 2022-01-18 RX ORDER — VALACYCLOVIR 1 G/1
1 TABLET, FILM COATED ORAL
Qty: 20 | Refills: 0 | Status: ACTIVE | COMMUNITY
Start: 2021-11-08

## 2022-01-18 NOTE — HISTORY OF PRESENT ILLNESS
[Disease: _____________________] : Disease: [unfilled] [T: ___] : T[unfilled] [N: ___] : N[unfilled] [M: ___] : M[unfilled] [AJCC Stage: ____] : AJCC Stage: [unfilled] [de-identified] : Right breast cancer and Left LCIS  diagnosed at the age of 46\par 09/23/11 Bilateral mammogram and breast ultrasound showed a right breast 9 o'clock axis 6 cm FN focal abnormality\par 10/04/11 Left breast mammogram and bilateral breast ultrasound showed heterogeneous tissue at right 9 o'clock axis \par 04/11/12 Right breast ultrasound showed four hypoechoic nodular lesions at 9 o'clock 6 cm FN measuring 0.4 x 0.7 cm. One new lesion measuring 0.7 x 0.3 x 0.5 cm while other 3 unchanged. Aspiration/Core biopsy advised.\par 04/19/12 RIght breast 9 o'clock axis deep, sonoguided core biopsy: IDC and focal DCIS, nuclear grade 2, solid type, ER 82%, ND 73%, HER-2 negative, KI-67 6%\par                Right breast 9 o'clock axis  superficial cluster of nodules consistent with fibroadenoma \par 04/26/12 MRI of the breast showed a 1 cm mass in the right 9 o'clock axis 7 cm FN corresponding to the biopsy proven malignancy. No evidences of multifocal or multicentric disease. Indeterminant 1 cm nodule in the left 6 o'clock axis for which MRI guided core biopsy was recommended\par 04/27/12 PET/CT scan negative\par 05/02/2012 [employer] genetic Comprehensive BRACAnalysis revealed BRCA2 deleterious mutation\par 05/07/12 Right mastectomy: 0.7 cm IDC, SBR 8/9 with 0/8 SLN involvement. DCIS, nuclear grade 3, solid and cribriform types DCIS is present admixed & away from invasive tumor.   Left mastectomy showed focal LCIS and minute focus of ADH with 0/2 SLN \par 05/07/12 Oncotype DX score of 17 with 5 year risk of distant recurrence of 10% on Tamoxifen alone\par 2012: GEORGE-BSO\par 05/2012 - 12/14 Tamoxifen \par 1/2015 - 4/2022  Letrozole \par 01/03/18 CONNIE Flap reconstruction after an implant ruptured [de-identified] : Right: 0.7 cm IDC, SBR 8/9, ER 82%, ID 73%, HER-2 negative, 0/8 SLN, Oncotype = 17 (RR 10%); Left: LCIS [de-identified] : Yumiko is seen for a virtual follow up visit today due to the COVID-19 pandemic.\par She started letrozole in 1/2015 and will complete 10 years of endocrine therapy in 4/22 (tamoxifen followed by letrozole).\par She had a sonogram and breast MRI in 7/21 which were negative.\par She had an MRI of the neck and brain as she had a neck injury and they were negative. She also had cognitive testing with Dr. Kimbrough as she continues to have memory issues and it did show mild cognitive impairment. She started taking Neuriva which is a supplement for the memory.\par She is fully vaccinated for COVID with the Pfizer vaccine.\par Her daughter who also tested positive for BRACA 2, has had prophylactic mastectomies had her eggs frozen.\par \par HEALTH MAINTENANCE:\par Mammogram: s/p bilateral mastectomies\par Pap smear: s/p GEORGE-BSO\par Bone density DEXA scan: 12/13/21 showed T scores of -0.7 in spine, -0.6 in hip and -1.4 in femoral neck\par                                           12/9/19 showed T scores of -1.2 in spine, -0.4 in hip and -1.7 in radius and ulna\par                                           12/05/17 showed T scores of -0.7 in the spine, -1.0 in the femoral neck and -1.2 in distal left forearm\par Colonoscopy: 01/18/16 colonoscopy one 2 mm polyp in the sigmoid colon. 12/1/21 showed diverticulosis in sigmoid colon, two 3-5 mm polyps removed\par EGD: 2/20/17 showed multiple gastric polyps; 12/1/21 showed normal esophagus, multiple gastric polyps biopsied and normal, duodenum normal; next in 2-3 years\par Genetic testing: BRCA 2 +; daughter was tested and was positive so had bilateral mastectomies at age 24; her son is 21 and has not been tested\par 1/27/20 PET/CT scan at [facility] showed interval resolution of RUL consolidation with trace residual bronchial wall thickening and ground-glass opacity in right lung apex, compatible with resolution of pneumonia.\par

## 2022-01-18 NOTE — PHYSICAL EXAM
[Fully active, able to carry on all pre-disease performance without restriction] : Status 0 - Fully active, able to carry on all pre-disease performance without restriction [Normal] : affect appropriate [de-identified] : Normal respiratory effort. Speaking in full sentences without difficulty

## 2022-01-18 NOTE — REASON FOR VISIT
[Home] : at home, [unfilled] , at the time of the visit. [Medical Office: ([facility])___] : at the medical office located in  [Verbal consent obtained from patient] : the patient, [unfilled] [Follow-Up Visit] : a follow-up [FreeTextEntry2] : Breast Cancer

## 2022-02-25 ENCOUNTER — NON-APPOINTMENT (OUTPATIENT)
Age: 57
End: 2022-02-25

## 2022-03-14 ENCOUNTER — OUTPATIENT (OUTPATIENT)
Dept: OUTPATIENT SERVICES | Facility: HOSPITAL | Age: 57
LOS: 1 days | Discharge: HOME | End: 2022-03-14

## 2022-03-14 DIAGNOSIS — C50.919 MALIGNANT NEOPLASM OF UNSPECIFIED SITE OF UNSPECIFIED FEMALE BREAST: ICD-10-CM

## 2022-03-15 ENCOUNTER — APPOINTMENT (OUTPATIENT)
Dept: HEMATOLOGY ONCOLOGY | Facility: CLINIC | Age: 57
End: 2022-03-15

## 2022-05-19 ENCOUNTER — NON-APPOINTMENT (OUTPATIENT)
Age: 57
End: 2022-05-19

## 2022-05-20 ENCOUNTER — TRANSCRIPTION ENCOUNTER (OUTPATIENT)
Age: 57
End: 2022-05-20

## 2022-05-24 ENCOUNTER — APPOINTMENT (OUTPATIENT)
Dept: DISASTER EMERGENCY | Facility: HOSPITAL | Age: 57
End: 2022-05-24

## 2022-05-24 ENCOUNTER — OUTPATIENT (OUTPATIENT)
Dept: OUTPATIENT SERVICES | Facility: HOSPITAL | Age: 57
LOS: 1 days | End: 2022-05-24

## 2022-05-24 DIAGNOSIS — U07.1 COVID-19: ICD-10-CM

## 2022-07-06 ENCOUNTER — APPOINTMENT (OUTPATIENT)
Dept: OBGYN | Facility: CLINIC | Age: 57
End: 2022-07-06

## 2022-07-06 VITALS
SYSTOLIC BLOOD PRESSURE: 95 MMHG | WEIGHT: 151.25 LBS | HEIGHT: 62.32 IN | DIASTOLIC BLOOD PRESSURE: 65 MMHG | BODY MASS INDEX: 27.48 KG/M2 | HEART RATE: 71 BPM

## 2022-07-06 PROCEDURE — 99396 PREV VISIT EST AGE 40-64: CPT

## 2022-07-06 NOTE — PHYSICAL EXAM
[Appropriately responsive] : appropriately responsive [Alert] : alert [No Acute Distress] : no acute distress [No Lymphadenopathy] : no lymphadenopathy [Soft] : soft [Non-tender] : non-tender [Non-distended] : non-distended [No HSM] : No HSM [No Lesions] : no lesions [No Mass] : no mass [Oriented x3] : oriented x3 [Examination Of The Breasts] : a normal appearance [No Masses] : no breast masses were palpable [Labia Majora] : normal [Labia Minora] : normal [Normal] : normal [Chaperone Present] : A chaperone was present in the examining room during all aspects of the physical examination [Absent] : absent [Uterine Adnexae] : absent

## 2022-07-07 LAB
C TRACH RRNA SPEC QL NAA+PROBE: NOT DETECTED
HBV SURFACE AG SER QL: NONREACTIVE
HCV AB SER QL: NONREACTIVE
HCV S/CO RATIO: 0.23 S/CO
N GONORRHOEA RRNA SPEC QL NAA+PROBE: NOT DETECTED
SOURCE AMPLIFICATION: NORMAL
T PALLIDUM AB SER QL IA: NEGATIVE

## 2022-07-09 LAB — HIV1+2 AB SPEC QL IA.RAPID: NONREACTIVE

## 2022-07-17 ENCOUNTER — NON-APPOINTMENT (OUTPATIENT)
Age: 57
End: 2022-07-17

## 2022-10-17 ENCOUNTER — NON-APPOINTMENT (OUTPATIENT)
Age: 57
End: 2022-10-17

## 2022-12-05 ENCOUNTER — NON-APPOINTMENT (OUTPATIENT)
Age: 57
End: 2022-12-05

## 2023-01-12 ENCOUNTER — NON-APPOINTMENT (OUTPATIENT)
Age: 58
End: 2023-01-12

## 2023-08-03 ENCOUNTER — NON-APPOINTMENT (OUTPATIENT)
Age: 58
End: 2023-08-03

## 2023-10-10 ENCOUNTER — APPOINTMENT (OUTPATIENT)
Dept: OBGYN | Facility: CLINIC | Age: 58
End: 2023-10-10

## 2023-10-15 ENCOUNTER — OUTPATIENT (OUTPATIENT)
Dept: OUTPATIENT SERVICES | Facility: HOSPITAL | Age: 58
LOS: 1 days | Discharge: HOME | End: 2023-10-15

## 2023-10-15 DIAGNOSIS — C50.919 MALIGNANT NEOPLASM OF UNSPECIFIED SITE OF UNSPECIFIED FEMALE BREAST: ICD-10-CM

## 2023-10-16 ENCOUNTER — APPOINTMENT (OUTPATIENT)
Dept: HEMATOLOGY ONCOLOGY | Facility: CLINIC | Age: 58
End: 2023-10-16
Payer: COMMERCIAL

## 2023-10-16 VITALS
DIASTOLIC BLOOD PRESSURE: 77 MMHG | BODY MASS INDEX: 26.74 KG/M2 | TEMPERATURE: 96.7 F | WEIGHT: 147.71 LBS | OXYGEN SATURATION: 97 % | SYSTOLIC BLOOD PRESSURE: 119 MMHG | HEART RATE: 87 BPM | RESPIRATION RATE: 17 BRPM

## 2023-10-16 DIAGNOSIS — Z15.09 GENETIC SUSCEPTIBILITY TO MALIGNANT NEOPLASM OF BREAST: ICD-10-CM

## 2023-10-16 DIAGNOSIS — C50.911 MALIGNANT NEOPLASM OF UNSPECIFIED SITE OF RIGHT FEMALE BREAST: ICD-10-CM

## 2023-10-16 DIAGNOSIS — T45.1X5A PAIN IN UNSPECIFIED JOINT: ICD-10-CM

## 2023-10-16 DIAGNOSIS — F41.9 ANXIETY DISORDER, UNSPECIFIED: ICD-10-CM

## 2023-10-16 DIAGNOSIS — M25.50 PAIN IN UNSPECIFIED JOINT: ICD-10-CM

## 2023-10-16 DIAGNOSIS — Z15.01 GENETIC SUSCEPTIBILITY TO MALIGNANT NEOPLASM OF BREAST: ICD-10-CM

## 2023-10-16 DIAGNOSIS — M85.80 OTHER SPECIFIED DISORDERS OF BONE DENSITY AND STRUCTURE, UNSPECIFIED SITE: ICD-10-CM

## 2023-10-16 PROCEDURE — 99214 OFFICE O/P EST MOD 30 MIN: CPT

## 2023-10-16 RX ORDER — TOBRAMYCIN 3 MG/ML
0.3 SOLUTION/ DROPS OPHTHALMIC
Qty: 5 | Refills: 0 | Status: DISCONTINUED | COMMUNITY
Start: 2022-05-11 | End: 2023-10-16

## 2023-10-16 RX ORDER — CALCIUM CARBONATE/VITAMIN D3 600 MG-10
TABLET ORAL
Refills: 0 | Status: DISCONTINUED | COMMUNITY
End: 2023-10-16

## 2023-10-16 RX ORDER — SULFAMETHOXAZOLE AND TRIMETHOPRIM 800; 160 MG/1; MG/1
800-160 TABLET ORAL
Qty: 14 | Refills: 0 | Status: DISCONTINUED | COMMUNITY
Start: 2022-05-11 | End: 2023-10-16

## 2023-10-16 RX ORDER — BETAMETHASONE DIPROPIONATE 0.5 MG/G
0.05 CREAM TOPICAL
Qty: 45 | Refills: 0 | Status: DISCONTINUED | COMMUNITY
Start: 2021-11-10 | End: 2023-10-16

## 2023-10-17 PROBLEM — C50.911 BREAST CANCER, RIGHT: Status: ACTIVE | Noted: 2019-01-08

## 2023-10-17 PROBLEM — M85.80 OSTEOPENIA: Status: ACTIVE | Noted: 2019-07-09

## 2023-10-17 PROBLEM — M25.50 AROMATASE INHIBITOR-ASSOCIATED ARTHRALGIA: Status: ACTIVE | Noted: 2019-01-09

## 2023-12-11 ENCOUNTER — APPOINTMENT (OUTPATIENT)
Dept: RADIOLOGY | Facility: CLINIC | Age: 58
End: 2023-12-11

## 2024-02-08 ENCOUNTER — APPOINTMENT (OUTPATIENT)
Dept: RADIOLOGY | Facility: CLINIC | Age: 59
End: 2024-02-08
Payer: COMMERCIAL

## 2024-02-08 ENCOUNTER — OUTPATIENT (OUTPATIENT)
Dept: OUTPATIENT SERVICES | Facility: HOSPITAL | Age: 59
LOS: 1 days | End: 2024-02-08
Payer: COMMERCIAL

## 2024-02-08 DIAGNOSIS — M85.80 OTHER SPECIFIED DISORDERS OF BONE DENSITY AND STRUCTURE, UNSPECIFIED SITE: ICD-10-CM

## 2024-02-08 PROCEDURE — 77085 DXA BONE DENSITY AXL VRT FX: CPT

## 2024-02-08 PROCEDURE — 77085 DXA BONE DENSITY AXL VRT FX: CPT | Mod: 26

## 2024-03-21 ENCOUNTER — APPOINTMENT (OUTPATIENT)
Dept: OBGYN | Facility: CLINIC | Age: 59
End: 2024-03-21
Payer: COMMERCIAL

## 2024-03-21 VITALS
BODY MASS INDEX: 26.71 KG/M2 | HEIGHT: 62.32 IN | WEIGHT: 147 LBS | SYSTOLIC BLOOD PRESSURE: 104 MMHG | DIASTOLIC BLOOD PRESSURE: 64 MMHG | HEART RATE: 73 BPM

## 2024-03-21 DIAGNOSIS — Z01.419 ENCOUNTER FOR GYNECOLOGICAL EXAMINATION (GENERAL) (ROUTINE) W/OUT ABNORMAL FINDINGS: ICD-10-CM

## 2024-03-21 PROCEDURE — ZZZZZ: CPT

## 2024-03-21 RX ORDER — ESCITALOPRAM OXALATE 10 MG/1
10 TABLET, FILM COATED ORAL
Refills: 0 | Status: ACTIVE | COMMUNITY

## 2024-03-23 LAB
C TRACH RRNA SPEC QL NAA+PROBE: NOT DETECTED
HBV SURFACE AG SER QL: NONREACTIVE
HCV AB SER QL: NONREACTIVE
HCV S/CO RATIO: 0.18 S/CO
HIV1+2 AB SPEC QL IA.RAPID: NONREACTIVE
N GONORRHOEA RRNA SPEC QL NAA+PROBE: NOT DETECTED
SOURCE AMPLIFICATION: NORMAL
T PALLIDUM AB SER QL IA: NEGATIVE

## 2024-04-04 NOTE — PHYSICAL EXAM
[Appropriately responsive] : appropriately responsive [Chaperone Present] : A chaperone was present in the examining room during all aspects of the physical examination [Alert] : alert [No Acute Distress] : no acute distress [No Lymphadenopathy] : no lymphadenopathy [Soft] : soft [Non-tender] : non-tender [Non-distended] : non-distended [No HSM] : No HSM [No Lesions] : no lesions [No Mass] : no mass [Oriented x3] : oriented x3 [Examination Of The Breasts] : a normal appearance [No Masses] : no breast masses were palpable [Labia Majora] : normal [Labia Minora] : normal [Normal] : normal [Absent] : absent [Uterine Adnexae] : absent [FreeTextEntry1] : Sandy Burdick

## 2024-04-04 NOTE — HISTORY OF PRESENT ILLNESS
[Currently Active] : currently active [Mammogramdate] : mastecttomy/bilat [PapSmeardate] : hysterectomy [BoneDensityDate] : emr [ColonoscopyDate] : 12/2021

## 2024-05-06 ENCOUNTER — APPOINTMENT (OUTPATIENT)
Dept: OBGYN | Facility: CLINIC | Age: 59
End: 2024-05-06
Payer: COMMERCIAL

## 2024-05-06 VITALS — TEMPERATURE: 99.7 F

## 2024-05-06 VITALS
HEART RATE: 78 BPM | RESPIRATION RATE: 16 BRPM | OXYGEN SATURATION: 100 % | TEMPERATURE: 99.7 F | SYSTOLIC BLOOD PRESSURE: 121 MMHG | DIASTOLIC BLOOD PRESSURE: 78 MMHG

## 2024-05-06 DIAGNOSIS — R39.9 UNSPECIFIED SYMPTOMS AND SIGNS INVOLVING THE GENITOURINARY SYSTEM: ICD-10-CM

## 2024-05-06 DIAGNOSIS — N30.00 ACUTE CYSTITIS W/OUT HEMATURIA: ICD-10-CM

## 2024-05-06 LAB
BILIRUB UR QL STRIP: NEGATIVE
CLARITY UR: CLEAR
COLLECTION METHOD: NORMAL
GLUCOSE UR-MCNC: NEGATIVE
HCG UR QL: 0.2 EU/DL
HGB UR QL STRIP.AUTO: NORMAL
KETONES UR-MCNC: NEGATIVE
LEUKOCYTE ESTERASE UR QL STRIP: NEGATIVE
NITRITE UR QL STRIP: NEGATIVE
PH UR STRIP: 6
PROT UR STRIP-MCNC: NEGATIVE
SP GR UR STRIP: 1.02

## 2024-05-06 PROCEDURE — 99202 OFFICE O/P NEW SF 15 MIN: CPT

## 2024-05-06 PROCEDURE — 81003 URINALYSIS AUTO W/O SCOPE: CPT | Mod: NC,QW

## 2024-05-06 PROCEDURE — 99212 OFFICE O/P EST SF 10 MIN: CPT

## 2024-05-06 RX ORDER — CIPROFLOXACIN HYDROCHLORIDE 500 MG/1
500 TABLET, FILM COATED ORAL TWICE DAILY
Qty: 14 | Refills: 0 | Status: ACTIVE | COMMUNITY
Start: 2024-05-06 | End: 1900-01-01

## 2024-05-07 LAB
APPEARANCE: CLEAR
BACTERIA: NEGATIVE /HPF
BILIRUBIN URINE: NEGATIVE
BLOOD URINE: ABNORMAL
CALCIUM OXALATE CRYSTALS: PRESENT
CAST: 1 /LPF
COLOR: YELLOW
EPITHELIAL CELLS: 0 /HPF
GLUCOSE QUALITATIVE U: NEGATIVE MG/DL
KETONES URINE: NEGATIVE MG/DL
LEUKOCYTE ESTERASE URINE: ABNORMAL
MICROSCOPIC-UA: NORMAL
NITRITE URINE: NEGATIVE
PH URINE: 6
PROTEIN URINE: NEGATIVE MG/DL
RED BLOOD CELLS URINE: 1 /HPF
REVIEW: NORMAL
SPECIFIC GRAVITY URINE: 1.02
UROBILINOGEN URINE: 0.2 MG/DL
WHITE BLOOD CELLS URINE: 1 /HPF

## 2024-05-08 DIAGNOSIS — R10.9 UNSPECIFIED ABDOMINAL PAIN: ICD-10-CM

## 2024-05-08 DIAGNOSIS — R10.2 PELVIC AND PERINEAL PAIN: ICD-10-CM

## 2024-05-08 LAB — BACTERIA UR CULT: NORMAL

## 2024-05-10 ENCOUNTER — APPOINTMENT (OUTPATIENT)
Dept: CT IMAGING | Facility: IMAGING CENTER | Age: 59
End: 2024-05-10
Payer: COMMERCIAL

## 2024-05-10 ENCOUNTER — RESULT REVIEW (OUTPATIENT)
Age: 59
End: 2024-05-10

## 2024-05-10 ENCOUNTER — OUTPATIENT (OUTPATIENT)
Dept: OUTPATIENT SERVICES | Facility: HOSPITAL | Age: 59
LOS: 1 days | End: 2024-05-10
Payer: COMMERCIAL

## 2024-05-10 DIAGNOSIS — Z15.01 GENETIC SUSCEPTIBILITY TO MALIGNANT NEOPLASM OF BREAST: ICD-10-CM

## 2024-05-10 DIAGNOSIS — R10.2 PELVIC AND PERINEAL PAIN: ICD-10-CM

## 2024-05-10 DIAGNOSIS — R10.9 UNSPECIFIED ABDOMINAL PAIN: ICD-10-CM

## 2024-05-10 PROCEDURE — 74178 CT ABD&PLV WO CNTR FLWD CNTR: CPT | Mod: 26

## 2024-05-10 PROCEDURE — 74178 CT ABD&PLV WO CNTR FLWD CNTR: CPT

## 2024-05-14 ENCOUNTER — NON-APPOINTMENT (OUTPATIENT)
Age: 59
End: 2024-05-14

## 2024-05-14 ENCOUNTER — TRANSCRIPTION ENCOUNTER (OUTPATIENT)
Age: 59
End: 2024-05-14

## 2024-05-29 DIAGNOSIS — R31.29 OTHER MICROSCOPIC HEMATURIA: ICD-10-CM

## 2024-06-03 ENCOUNTER — APPOINTMENT (OUTPATIENT)
Dept: OBGYN | Facility: CLINIC | Age: 59
End: 2024-06-03
Payer: COMMERCIAL

## 2024-06-03 PROCEDURE — 99211 OFF/OP EST MAY X REQ PHY/QHP: CPT

## 2024-06-04 LAB
APPEARANCE: CLEAR
BACTERIA: NEGATIVE /HPF
BILIRUBIN URINE: NEGATIVE
BLOOD URINE: ABNORMAL
C TRACH RRNA SPEC QL NAA+PROBE: NOT DETECTED
CAST: 0 /LPF
COLOR: YELLOW
EPITHELIAL CELLS: 0 /HPF
GLUCOSE QUALITATIVE U: NEGATIVE MG/DL
HIV1+2 AB SPEC QL IA.RAPID: NONREACTIVE
KETONES URINE: NEGATIVE MG/DL
LEUKOCYTE ESTERASE URINE: NEGATIVE
MICROSCOPIC-UA: NORMAL
N GONORRHOEA RRNA SPEC QL NAA+PROBE: NOT DETECTED
NITRITE URINE: NEGATIVE
PH URINE: 5.5
PROTEIN URINE: NEGATIVE MG/DL
RED BLOOD CELLS URINE: 3 /HPF
SOURCE AMPLIFICATION: NORMAL
SPECIFIC GRAVITY URINE: 1.02
T PALLIDUM AB SER QL IA: NEGATIVE
UROBILINOGEN URINE: 0.2 MG/DL
WHITE BLOOD CELLS URINE: 1 /HPF

## 2024-06-05 LAB — BACTERIA UR CULT: NORMAL

## 2024-07-30 PROBLEM — Z86.69 HISTORY OF BELL'S PALSY: Status: RESOLVED | Noted: 2019-01-09 | Resolved: 2024-07-30

## 2024-09-22 ENCOUNTER — NON-APPOINTMENT (OUTPATIENT)
Age: 59
End: 2024-09-22
